# Patient Record
Sex: MALE | Race: WHITE
[De-identification: names, ages, dates, MRNs, and addresses within clinical notes are randomized per-mention and may not be internally consistent; named-entity substitution may affect disease eponyms.]

---

## 2020-10-21 ENCOUNTER — HOSPITAL ENCOUNTER (OUTPATIENT)
Dept: HOSPITAL 19 - COL.CAR | Age: 60
LOS: 1 days | Discharge: HOME | End: 2020-10-22
Attending: INTERNAL MEDICINE
Payer: COMMERCIAL

## 2020-10-21 VITALS — OXYGEN SATURATION: 92 %

## 2020-10-21 VITALS
HEART RATE: 70 BPM | TEMPERATURE: 97.9 F | DIASTOLIC BLOOD PRESSURE: 73 MMHG | OXYGEN SATURATION: 98 % | SYSTOLIC BLOOD PRESSURE: 111 MMHG

## 2020-10-21 VITALS — OXYGEN SATURATION: 99 %

## 2020-10-21 VITALS — OXYGEN SATURATION: 93 %

## 2020-10-21 VITALS — OXYGEN SATURATION: 94 %

## 2020-10-21 VITALS — HEART RATE: 70 BPM | DIASTOLIC BLOOD PRESSURE: 76 MMHG | SYSTOLIC BLOOD PRESSURE: 108 MMHG

## 2020-10-21 VITALS — OXYGEN SATURATION: 97 %

## 2020-10-21 VITALS — DIASTOLIC BLOOD PRESSURE: 72 MMHG | SYSTOLIC BLOOD PRESSURE: 112 MMHG | HEART RATE: 68 BPM | TEMPERATURE: 97.5 F

## 2020-10-21 VITALS — OXYGEN SATURATION: 96 %

## 2020-10-21 VITALS — OXYGEN SATURATION: 95 %

## 2020-10-21 VITALS — TEMPERATURE: 97.9 F | HEART RATE: 69 BPM | SYSTOLIC BLOOD PRESSURE: 124 MMHG | DIASTOLIC BLOOD PRESSURE: 82 MMHG

## 2020-10-21 VITALS — OXYGEN SATURATION: 98 %

## 2020-10-21 VITALS — SYSTOLIC BLOOD PRESSURE: 115 MMHG | OXYGEN SATURATION: 97 % | HEART RATE: 71 BPM | DIASTOLIC BLOOD PRESSURE: 89 MMHG

## 2020-10-21 VITALS — OXYGEN SATURATION: 95 % | HEART RATE: 65 BPM | DIASTOLIC BLOOD PRESSURE: 76 MMHG | SYSTOLIC BLOOD PRESSURE: 106 MMHG

## 2020-10-21 VITALS — OXYGEN SATURATION: 100 %

## 2020-10-21 VITALS — HEART RATE: 64 BPM | SYSTOLIC BLOOD PRESSURE: 101 MMHG | DIASTOLIC BLOOD PRESSURE: 77 MMHG

## 2020-10-21 VITALS — HEART RATE: 63 BPM | DIASTOLIC BLOOD PRESSURE: 73 MMHG | SYSTOLIC BLOOD PRESSURE: 104 MMHG | TEMPERATURE: 97.9 F

## 2020-10-21 VITALS — HEART RATE: 68 BPM | DIASTOLIC BLOOD PRESSURE: 73 MMHG | SYSTOLIC BLOOD PRESSURE: 109 MMHG

## 2020-10-21 VITALS — SYSTOLIC BLOOD PRESSURE: 129 MMHG | DIASTOLIC BLOOD PRESSURE: 77 MMHG | HEART RATE: 67 BPM

## 2020-10-21 VITALS — OXYGEN SATURATION: 90 %

## 2020-10-21 VITALS — HEART RATE: 71 BPM | TEMPERATURE: 97.4 F | DIASTOLIC BLOOD PRESSURE: 86 MMHG | SYSTOLIC BLOOD PRESSURE: 126 MMHG

## 2020-10-21 VITALS — OXYGEN SATURATION: 91 %

## 2020-10-21 VITALS — DIASTOLIC BLOOD PRESSURE: 66 MMHG | SYSTOLIC BLOOD PRESSURE: 93 MMHG | HEART RATE: 68 BPM | OXYGEN SATURATION: 98 %

## 2020-10-21 VITALS — DIASTOLIC BLOOD PRESSURE: 78 MMHG | HEART RATE: 70 BPM | SYSTOLIC BLOOD PRESSURE: 110 MMHG | OXYGEN SATURATION: 97 %

## 2020-10-21 VITALS — OXYGEN SATURATION: 89 %

## 2020-10-21 VITALS — SYSTOLIC BLOOD PRESSURE: 113 MMHG | HEART RATE: 66 BPM | DIASTOLIC BLOOD PRESSURE: 81 MMHG | OXYGEN SATURATION: 97 %

## 2020-10-21 VITALS — SYSTOLIC BLOOD PRESSURE: 133 MMHG | DIASTOLIC BLOOD PRESSURE: 83 MMHG | HEART RATE: 74 BPM

## 2020-10-21 VITALS — OXYGEN SATURATION: 90 % | SYSTOLIC BLOOD PRESSURE: 112 MMHG | HEART RATE: 73 BPM | DIASTOLIC BLOOD PRESSURE: 82 MMHG

## 2020-10-21 VITALS — BODY MASS INDEX: 23.82 KG/M2 | WEIGHT: 157.19 LBS | HEIGHT: 67.99 IN

## 2020-10-21 VITALS — DIASTOLIC BLOOD PRESSURE: 77 MMHG | HEART RATE: 70 BPM | SYSTOLIC BLOOD PRESSURE: 104 MMHG

## 2020-10-21 VITALS — OXYGEN SATURATION: 87 %

## 2020-10-21 VITALS — OXYGEN SATURATION: 83 %

## 2020-10-21 DIAGNOSIS — I73.9: ICD-10-CM

## 2020-10-21 DIAGNOSIS — E03.9: ICD-10-CM

## 2020-10-21 DIAGNOSIS — E78.5: ICD-10-CM

## 2020-10-21 DIAGNOSIS — Z20.828: ICD-10-CM

## 2020-10-21 DIAGNOSIS — Z79.899: ICD-10-CM

## 2020-10-21 DIAGNOSIS — I65.21: ICD-10-CM

## 2020-10-21 DIAGNOSIS — F17.210: ICD-10-CM

## 2020-10-21 DIAGNOSIS — I25.10: Primary | ICD-10-CM

## 2020-10-21 DIAGNOSIS — J43.8: ICD-10-CM

## 2020-10-21 DIAGNOSIS — Z95.1: ICD-10-CM

## 2020-10-21 DIAGNOSIS — Z79.82: ICD-10-CM

## 2020-10-21 LAB
ALBUMIN SERPL-MCNC: 4.2 GM/DL (ref 3.5–5)
ALP SERPL-CCNC: 48 U/L (ref 50–136)
ALT SERPL-CCNC: 13 U/L (ref 4–49)
ANION GAP SERPL CALC-SCNC: 8 MMOL/L (ref 7–16)
APTT PPP: 34.9 SECONDS (ref 26–37)
AST SERPL-CCNC: 21 U/L (ref 15–37)
BILIRUB SERPL-MCNC: 0.4 MG/DL (ref 0–1)
BUN SERPL-MCNC: 22 MG/DL (ref 9–20)
CALCIUM SERPL-MCNC: 9.4 MG/DL (ref 8.4–10.2)
CHLORIDE SERPL-SCNC: 104 MMOL/L (ref 98–107)
CHOLEST SPEC-SCNC: 113 MG/DL (ref 120–200)
CHOLEST/HDLC SERPL-SRTO: 4.1
CO2 SERPL-SCNC: 29 MMOL/L (ref 22–30)
CREAT SERPL-SCNC: 0.89 UMOL/L (ref 0.66–1.25)
ERYTHROCYTE [DISTWIDTH] IN BLOOD BY AUTOMATED COUNT: 12.8 % (ref 11.5–14.5)
GLUCOSE SERPL-MCNC: 100 MG/DL (ref 74–106)
HCT VFR BLD AUTO: 39.3 % (ref 42–52)
HDLC SERPL-MCNC: 27 MG/DL
HGB BLD-MCNC: 12.8 G/DL (ref 13.5–18)
INR BLD: 1 (ref 0.8–3)
LDLC SERPL-MCNC: 57 MG/DL
MAGNESIUM SERPL-MCNC: 2 MG/DL (ref 1.6–2.3)
MCH RBC QN AUTO: 31 PG (ref 27–31)
MCHC RBC AUTO-ENTMCNC: 33 G/DL (ref 33–37)
MCV RBC AUTO: 94 FL (ref 80–100)
PLATELET # BLD AUTO: 254 K/MM3 (ref 130–400)
PMV BLD AUTO: 9.7 FL (ref 7.4–10.4)
POTASSIUM SERPL-SCNC: 4.4 MMOL/L (ref 3.4–5)
PROT SERPL-MCNC: 7.9 GM/DL (ref 6.4–8.2)
PROTHROMBIN TIME: 11.7 SECONDS (ref 9.7–12.8)
RBC # BLD AUTO: 4.17 M/MM3 (ref 4.2–5.6)
SODIUM SERPL-SCNC: 141 MMOL/L (ref 137–145)
TRIGL SERPL-MCNC: 144 MG/DL

## 2020-10-21 PROCEDURE — C9600 PERC DRUG-EL COR STENT SING: HCPCS

## 2020-10-22 VITALS — OXYGEN SATURATION: 94 %

## 2020-10-22 VITALS — OXYGEN SATURATION: 96 %

## 2020-10-22 VITALS — OXYGEN SATURATION: 97 %

## 2020-10-22 VITALS — OXYGEN SATURATION: 90 %

## 2020-10-22 VITALS — OXYGEN SATURATION: 98 %

## 2020-10-22 VITALS — OXYGEN SATURATION: 95 %

## 2020-10-22 VITALS — OXYGEN SATURATION: 92 %

## 2020-10-22 VITALS — OXYGEN SATURATION: 93 %

## 2020-10-22 VITALS — TEMPERATURE: 97.4 F | HEART RATE: 62 BPM | SYSTOLIC BLOOD PRESSURE: 93 MMHG | DIASTOLIC BLOOD PRESSURE: 71 MMHG

## 2020-10-22 VITALS — OXYGEN SATURATION: 88 %

## 2020-10-22 VITALS — DIASTOLIC BLOOD PRESSURE: 77 MMHG | SYSTOLIC BLOOD PRESSURE: 113 MMHG | TEMPERATURE: 98 F | HEART RATE: 68 BPM

## 2020-10-22 VITALS
OXYGEN SATURATION: 94 % | HEART RATE: 66 BPM | DIASTOLIC BLOOD PRESSURE: 87 MMHG | SYSTOLIC BLOOD PRESSURE: 133 MMHG | TEMPERATURE: 97.5 F

## 2020-10-22 VITALS — OXYGEN SATURATION: 100 %

## 2020-10-22 VITALS — OXYGEN SATURATION: 91 %

## 2020-10-22 VITALS — OXYGEN SATURATION: 99 %

## 2020-10-22 VITALS — OXYGEN SATURATION: 89 %

## 2020-10-22 LAB
ANION GAP SERPL CALC-SCNC: 7 MMOL/L (ref 7–16)
BASOPHILS # BLD: 0.1 10*3/UL (ref 0–0.2)
BASOPHILS NFR BLD AUTO: 0.8 % (ref 0–2)
BUN SERPL-MCNC: 20 MG/DL (ref 9–20)
CALCIUM SERPL-MCNC: 9.2 MG/DL (ref 8.4–10.2)
CHLORIDE SERPL-SCNC: 105 MMOL/L (ref 98–107)
CO2 SERPL-SCNC: 27 MMOL/L (ref 22–30)
CREAT SERPL-SCNC: 0.86 UMOL/L (ref 0.66–1.25)
EOSINOPHIL # BLD: 0.3 10*3/UL (ref 0–0.7)
EOSINOPHIL NFR BLD: 5 % (ref 0–4)
ERYTHROCYTE [DISTWIDTH] IN BLOOD BY AUTOMATED COUNT: 12.7 % (ref 11.5–14.5)
GLUCOSE SERPL-MCNC: 96 MG/DL (ref 74–106)
GRANULOCYTES # BLD AUTO: 64.6 % (ref 42.2–75.2)
HCT VFR BLD AUTO: 37.9 % (ref 42–52)
HGB BLD-MCNC: 12.5 G/DL (ref 13.5–18)
LYMPHOCYTES # BLD: 1.1 10*3/UL (ref 1.2–3.4)
LYMPHOCYTES NFR BLD: 18.9 % (ref 20–51)
MCH RBC QN AUTO: 31 PG (ref 27–31)
MCHC RBC AUTO-ENTMCNC: 33 G/DL (ref 33–37)
MCV RBC AUTO: 94 FL (ref 80–100)
MONOCYTES # BLD: 0.6 10*3/UL (ref 0.1–0.6)
MONOCYTES NFR BLD AUTO: 10.4 % (ref 1.7–9.3)
NEUTROPHILS # BLD: 3.9 10*3/UL (ref 1.4–6.5)
PLATELET # BLD AUTO: 230 K/MM3 (ref 130–400)
PMV BLD AUTO: 10 FL (ref 7.4–10.4)
POTASSIUM SERPL-SCNC: 3.9 MMOL/L (ref 3.4–5)
RBC # BLD AUTO: 4.05 M/MM3 (ref 4.2–5.6)
SODIUM SERPL-SCNC: 139 MMOL/L (ref 137–145)

## 2023-04-27 ENCOUNTER — HOSPITAL ENCOUNTER (OUTPATIENT)
Dept: HOSPITAL 19 - SDCO | Age: 63
Discharge: HOME | End: 2023-04-27
Attending: UROLOGY
Payer: COMMERCIAL

## 2023-04-27 VITALS — SYSTOLIC BLOOD PRESSURE: 130 MMHG | TEMPERATURE: 97 F | DIASTOLIC BLOOD PRESSURE: 77 MMHG | HEART RATE: 64 BPM

## 2023-04-27 VITALS — DIASTOLIC BLOOD PRESSURE: 77 MMHG | SYSTOLIC BLOOD PRESSURE: 130 MMHG | TEMPERATURE: 97.1 F | HEART RATE: 63 BPM

## 2023-04-27 VITALS — TEMPERATURE: 97.9 F | SYSTOLIC BLOOD PRESSURE: 150 MMHG | HEART RATE: 63 BPM | DIASTOLIC BLOOD PRESSURE: 89 MMHG

## 2023-04-27 VITALS — HEIGHT: 67.99 IN | BODY MASS INDEX: 24.39 KG/M2 | WEIGHT: 160.94 LBS

## 2023-04-27 VITALS — SYSTOLIC BLOOD PRESSURE: 122 MMHG | DIASTOLIC BLOOD PRESSURE: 75 MMHG | HEART RATE: 64 BPM

## 2023-04-27 VITALS — HEART RATE: 63 BPM | DIASTOLIC BLOOD PRESSURE: 77 MMHG | SYSTOLIC BLOOD PRESSURE: 121 MMHG

## 2023-04-27 DIAGNOSIS — N43.3: Primary | ICD-10-CM

## 2023-04-27 DIAGNOSIS — F17.210: ICD-10-CM

## 2023-04-27 NOTE — NUR
1425-REPORT OBTAINED FROM NILSA BEE. PATIENT ARRIV[place] [facility] [place] [facility] [place]RETCHER, VITAL SIGNS TAKEN ON ARRIVAL. VSS. PATIENT ALERT AND ORIENTED.
DENIES NAUSEA AND PAIN, DRESSING CDI WITH SCROTAL SLING IN PLACE. ICE PACK TO
SITE. PATIENT'S FAMILY MEMBER BROUGHT TO BEDSIDE. PO INTAKE PROVIDED, PATIENT
TOLERATING PO INTAKE WELL
 
1450- DISCHARGE INSTRUCTIONS REVIEWED WITH PT AND FAMILY, QUESTIONS INVITED.
PATIENT DENIES NAUSEA/PAIN.
 
1455-PATIENT DRESSED INDEPENDENTLY. IV CATHETER SITE REMOVED, CATHETER TIP
INTACT. PRESSURE HELD AND BANDAGE APPLIED.
 
1510-PATIENT DISCHARGED TO HOME VIA WHEELCHAIR, ACCOMPANIED BY FAMILY AND
FRIEND. ALL BELONGINGS SENT WITH PT AT TIME OF DISCHARGE.